# Patient Record
Sex: MALE | URBAN - METROPOLITAN AREA
[De-identification: names, ages, dates, MRNs, and addresses within clinical notes are randomized per-mention and may not be internally consistent; named-entity substitution may affect disease eponyms.]

---

## 2019-09-01 ENCOUNTER — OUTPATIENT (OUTPATIENT)
Dept: OUTPATIENT SERVICES | Facility: HOSPITAL | Age: 54
LOS: 1 days | End: 2019-09-01
Payer: MEDICAID

## 2019-09-01 PROCEDURE — G9001: CPT

## 2019-09-19 ENCOUNTER — EMERGENCY (EMERGENCY)
Facility: HOSPITAL | Age: 54
LOS: 1 days | Discharge: HOME | End: 2019-09-19
Attending: EMERGENCY MEDICINE
Payer: MEDICAID

## 2019-09-19 VITALS
DIASTOLIC BLOOD PRESSURE: 70 MMHG | HEART RATE: 70 BPM | RESPIRATION RATE: 18 BRPM | OXYGEN SATURATION: 97 % | SYSTOLIC BLOOD PRESSURE: 107 MMHG | HEIGHT: 74 IN | TEMPERATURE: 98 F | WEIGHT: 259.93 LBS

## 2019-09-19 PROCEDURE — 71046 X-RAY EXAM CHEST 2 VIEWS: CPT

## 2019-09-19 PROCEDURE — 94640 AIRWAY INHALATION TREATMENT: CPT

## 2019-09-19 PROCEDURE — 93010 ELECTROCARDIOGRAM REPORT: CPT

## 2019-09-19 PROCEDURE — 71046 X-RAY EXAM CHEST 2 VIEWS: CPT | Mod: 26

## 2019-09-19 PROCEDURE — 99285 EMERGENCY DEPT VISIT HI MDM: CPT

## 2019-09-19 PROCEDURE — 93005 ELECTROCARDIOGRAM TRACING: CPT

## 2019-09-19 PROCEDURE — 99284 EMERGENCY DEPT VISIT MOD MDM: CPT | Mod: 25

## 2019-09-19 RX ORDER — ALBUTEROL 90 UG/1
2.5 AEROSOL, METERED ORAL ONCE
Refills: 0 | Status: COMPLETED | OUTPATIENT
Start: 2019-09-19 | End: 2019-09-19

## 2019-09-19 RX ORDER — AZITHROMYCIN 500 MG/1
1 TABLET, FILM COATED ORAL
Qty: 1 | Refills: 0
Start: 2019-09-19 | End: 2019-09-23

## 2019-09-19 RX ADMIN — Medication 60 MILLIGRAM(S): at 13:57

## 2019-09-19 RX ADMIN — ALBUTEROL 2.5 MILLIGRAM(S): 90 AEROSOL, METERED ORAL at 13:41

## 2019-09-19 RX ADMIN — Medication 100 MILLIGRAM(S): at 13:57

## 2019-09-19 NOTE — ED ADULT NURSE NOTE - OBJECTIVE STATEMENT
55yo male c/o cough and congestion for "a couple of weeks" pt states last week he went to [facility] and was given a cough medicine with temporary relief. pt denies any fevers, chills, n/v/d, sob, chest pain.

## 2019-09-19 NOTE — ED PROVIDER NOTE - CARE PLAN
Principal Discharge DX:	Bronchitis  Assessment and plan of treatment:	1. Return to ED for worsening, progressive or any other concerning symptoms   2. Follow up with your primary care doctor in 2-3days   3. Take 40mg of prednisone once a day for 4 days   4. Take 2-4 puffs of albuterol as needed for SOB/cough   5. Take tessalon pearles as directed   6. Take Z pack as directed   7. Follow up with pulmonology as needed

## 2019-09-19 NOTE — ED ADULT TRIAGE NOTE - CHIEF COMPLAINT QUOTE
Patient arrived to ED today with c/o chest congestion, cough, dizziness when coughing, and at times SOB for the past 2+ weeks.  Patient reports he was doing better with cough medicine and after it ran out his symptoms started again.

## 2019-09-19 NOTE — ED PROVIDER NOTE - PATIENT PORTAL LINK FT
You can access the FollowMyHealth Patient Portal offered by [facility] by registering at the following website: [email or web address]. By joining [employer]’s FollowMyHealth portal, you will also be able to view your health information using other applications (apps) compatible with our system.

## 2019-09-19 NOTE — ED PROVIDER NOTE - ATTENDING CONTRIBUTION TO CARE
I, Sena Vitale, have personally seen and examined this patient. I have fully participated in the care of this patient. I have reviewed all pertinent clinical information, including history, physical exam, plan and the Medical Student's note and agree except as noted below.     see mdm

## 2019-09-19 NOTE — ED PROVIDER NOTE - OBJECTIVE STATEMENT
Pt is a 53 y/o M with a PMHx of childhood asthma (+hospitalization) who p/w SOB. Patient has had a productive cough with nasal and chest congestion for several weeks. He had a fever a few weeks ago that has since resolved. He went to urgent care last week, and was prescribed a cough medicine with improvement of symptoms until the prescription ran out today and patient had worsening of cough and SOB. His SOB worsened with bike riding. He also complains of wheezing, dizziness with cough, and +sick contact of wife with URI a few weeks ago. He denies chest pain, palpitations, NVD, abdominal pain, leg swelling/pain.

## 2019-09-19 NOTE — ED PROVIDER NOTE - CLINICAL SUMMARY MEDICAL DECISION MAKING FREE TEXT BOX
53 y/o M with history of childhood asthma who p/w nasal/chest congestion, productive cough, and SOB for several weeks, worse today after ran out of cough medicine. CXR to evaluate for pneumonia. 53 y/o M with history of childhood asthma who p/w nasal/chest congestion, productive cough, and SOB for several weeks, worse today after ran out of cough medicine. CXR to evaluate for pneumonia. Duonebs for symptomatic relief. 57yo M no cardiac history with cough and congesiton for a few weeks, had fever resolved, cough persistent productive. also with SOB after coughing fits and excessive exertion. no CP. no sick contacts. no LE edema. PE- NAD, faint rhonchi clears with cough no wheeze no resp distress, no LE edema. abd soft NT. P- likely bronchitis. will r/o PNA, albuterol/steroids. abx prn. PERC negative. EKG no concerning changes. no concern for ACS. otupt Follow up -Winifred QUIROZ

## 2019-09-19 NOTE — ED PROVIDER NOTE - PLAN OF CARE
1. Return to ED for worsening, progressive or any other concerning symptoms   2. Follow up with your primary care doctor in 2-3days   3. Take 40mg of prednisone once a day for 4 days   4. Take 2-4 puffs of albuterol as needed for SOB/cough   5. Take tessalon pearles as directed   6. Take Z pack as directed   7. Follow up with pulmonology as needed

## 2019-09-19 NOTE — ED ADULT NURSE NOTE - NSIMPLEMENTINTERV_GEN_ALL_ED
Implemented All Universal Safety Interventions:  [place] to call system. Call bell, personal items and telephone within reach. Instruct patient to call for assistance. Room bathroom lighting operational. Non-slip footwear when patient is off stretcher. Physically safe environment: no spills, clutter or unnecessary equipment. Stretcher in lowest position, wheels locked, appropriate side rails in place.

## 2019-09-20 DIAGNOSIS — Z71.89 OTHER SPECIFIED COUNSELING: ICD-10-CM

## 2019-10-01 ENCOUNTER — OUTPATIENT (OUTPATIENT)
Dept: OUTPATIENT SERVICES | Facility: HOSPITAL | Age: 54
LOS: 1 days | End: 2019-10-01

## 2019-10-23 DIAGNOSIS — Z71.89 OTHER SPECIFIED COUNSELING: ICD-10-CM

## 2020-08-19 DIAGNOSIS — Z71.89 OTHER SPECIFIED COUNSELING: ICD-10-CM
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
